# Patient Record
Sex: MALE | Race: WHITE | URBAN - METROPOLITAN AREA
[De-identification: names, ages, dates, MRNs, and addresses within clinical notes are randomized per-mention and may not be internally consistent; named-entity substitution may affect disease eponyms.]

---

## 2020-12-28 ENCOUNTER — OFFICE VISIT (OUTPATIENT)
Dept: FAMILY MEDICINE | Facility: CLINIC | Age: 50
End: 2020-12-28
Payer: COMMERCIAL

## 2020-12-28 ENCOUNTER — ALLIED HEALTH/NURSE VISIT (OUTPATIENT)
Dept: NURSING | Facility: CLINIC | Age: 50
End: 2020-12-28
Payer: COMMERCIAL

## 2020-12-28 DIAGNOSIS — R07.9 CHEST PAIN: Primary | ICD-10-CM

## 2020-12-28 DIAGNOSIS — E78.5 HYPERLIPIDEMIA LDL GOAL <70: ICD-10-CM

## 2020-12-28 DIAGNOSIS — I46.9 CARDIAC ARREST (H): ICD-10-CM

## 2020-12-28 DIAGNOSIS — I25.110 CORONARY ARTERY DISEASE INVOLVING NATIVE CORONARY ARTERY OF NATIVE HEART WITH UNSTABLE ANGINA PECTORIS (H): ICD-10-CM

## 2020-12-28 PROCEDURE — 99203 OFFICE O/P NEW LOW 30 MIN: CPT | Performed by: FAMILY MEDICINE

## 2020-12-28 RX ORDER — NITROGLYCERIN 0.4 MG/1
0.4 TABLET SUBLINGUAL EVERY 5 MIN PRN
Status: ACTIVE | OUTPATIENT
Start: 2020-12-28

## 2020-12-28 RX ORDER — ASPIRIN 81 MG/1
81 TABLET, CHEWABLE ORAL ONCE
Status: COMPLETED | OUTPATIENT
Start: 2020-12-28 | End: 2020-12-28

## 2020-12-28 RX ADMIN — NITROGLYCERIN 0.4 MG: 0.4 TABLET SUBLINGUAL at 11:11

## 2020-12-28 RX ADMIN — ASPIRIN 325 MG: 81 TABLET, CHEWABLE ORAL at 11:14

## 2020-12-28 NOTE — PROGRESS NOTES
[facility]  [place]    SUBJECTIVE    Richar Frost is a 50 year old male who presents to clinic today for the following health issues:    Richar presented as a walk in with chest tightness, new to the clinic, with his spouse, while on the scale, he was noted to collapse, further assessment revealed he was unconscious, and in cardiac arrest    Promptly CPR was started, 911 called, O2, ambu bag/mask ventilation, AED attached, rhythm analyzed, shocked x 2, nitroglycerin x 1 given, 4 ASA 81 mg chewables given, he was able to swallow, pads had to be repetatively resecured, patient had episodes regaining consciousness    911 arrived, on their monitor he was noted to go into VT, he was shocked a 3rd time, went back into sinus rhythm, he was quickly lifted onto the gurney, transport was advised to [facility] for urgent cath lab evaluation, [facility] notified    See attached notes    Reviewed and updated as needed this visit by Provider                   BP Readings from Last 3 Encounters:   No data found for BP       body mass index is unknown because there is no height or weight on file.    Wt Readings from Last 4 Encounters:   No data found for Wt       Health Maintenance    Health Maintenance Due   Topic Date Due     PREVENTIVE CARE VISIT  1970     ANNUAL REVIEW OF HM ORDERS  1970     HIV SCREENING  07/14/1986     HEPATITIS C SCREENING  07/14/1989     DTAP/TDAP/TD IMMUNIZATION (1 - Tdap) 07/14/1996     LIPID  07/14/2006     PHQ-2  01/01/2020     INFLUENZA VACCINE (1) 09/01/2020       Current Problem List    Patient Active Problem List   Diagnosis     Cardiac arrest (H)     Hyperlipidemia LDL goal <70     Coronary artery disease involving native coronary artery of native heart with unstable angina pectoris (H)       Past Medical History    Past Medical History:   Diagnosis Date     Cardiac arrest (H) 12/28/2020    STEMI with VT, LAD stent     Coronary artery disease involving native coronary artery of native  heart with unstable angina pectoris (H) 12/28/2020    cardiac arrest with VT, LAD stent     Hyperlipidemia LDL goal <70        Past Surgical History    Past Surgical History:   Procedure Laterality Date     SURGICAL HISTORY OF -   12/28/2020    LAD Stent       Current Medications    No current outpatient medications on file.       Allergies    Not on File    Immunizations      There is no immunization history on file for this patient.    Family History    No family history on file.    Social History    Social History     Socioeconomic History     Marital status:      Spouse name: Not on file     Number of children: Not on file     Years of education: Not on file     Highest education level: Not on file   Occupational History     Not on file   Social Needs     Financial resource strain: Not on file     Food insecurity     Worry: Not on file     Inability: Not on file     Transportation needs     Medical: Not on file     Non-medical: Not on file   Tobacco Use     Smoking status: Not on file   Substance and Sexual Activity     Alcohol use: Not on file     Drug use: Not on file     Sexual activity: Not on file   Lifestyle     Physical activity     Days per week: Not on file     Minutes per session: Not on file     Stress: Not on file   Relationships     Social connections     Talks on phone: Not on file     Gets together: Not on file     Attends Religion service: Not on file     Active member of club or organization: Not on file     Attends meetings of clubs or organizations: Not on file     Relationship status: Not on file     Intimate partner violence     Fear of current or ex partner: Not on file     Emotionally abused: Not on file     Physically abused: Not on file     Forced sexual activity: Not on file   Other Topics Concern     Not on file   Social History Narrative     Not on file       All above reviewed and updated, all stable unless otherwise noted    Recent labs reviewed    ROS    Limited, as  above    OBJECTIVE    There were no vitals taken for this visit.  There is no height or weight on file to calculate BMI.  GENERAL: varying level on consciousness  EYES: Eyes grossly normal to inspection, extraocular movements - intact, and PERRL  RESP: lungs clear to auscultation - no rales, no rhonchi, no wheezes  CV: Variable, regular/irregular, rate variable with tachycardia  ABDOMEN: soft, no tenderness, no  hepatosplenomegaly, no masses, normal bowel sounds  MS: extremities- no gross deformities noted, no edema  SKIN: no suspicious lesions, no rashes  NEURO: non focal  PSYCH: variable    DIAGNOSTICS/PROCEDURE    See above     ASSESSMENT      ICD-10-CM    1. Cardiac arrest (H)  I46.9    2. Coronary artery disease involving native coronary artery of native heart with unstable angina pectoris (H)  I25.110    3. Hyperlipidemia LDL goal <70  E78.5        PLAN    Discussed treatment/modality options, including risk and benefits he desires:    See above resuscitation and transport    All diagnosis above reviewed and noted above, otherwise stable.  See [facility] orders for further details.     Return in about 1 week (around 1/4/2021), or if symptoms worsen or fail to improve.    Health Maintenance Due   Topic Date Due     PREVENTIVE CARE VISIT  1971     ANNUAL REVIEW OF  ORDERS  1970     HIV SCREENING  07/14/1986     HEPATITIS C SCREENING  07/14/1989     DTAP/TDAP/TD IMMUNIZATION (1 - Tdap) 07/14/1996     LIPID  07/14/2006     PHQ-2  01/01/2020     INFLUENZA VACCINE (1) 09/01/2020       See Patient Instructions             Juan Alberto Bass MD, FAAFP     [facility]  [address] [place] [id number]  [email or web address]   Office: [phone]  Fax: [phone]  Pager: [phone]

## 2020-12-28 NOTE — PROGRESS NOTES
Pt presented with chest pain acroos top of chest. Pt was walked back to get on scale for weight when Pt passed out falling to floor on his back. Pt then noted to have difficulty breathing. Writer auscultated heart without any heart tones noted. 911 called, Sujata BALDWIN ran to get AED, pads applied noting shock needed. First shock given at 1104am. CPR started, oxygen applied at 10L via mask. ambubag applied shortly after. Three rounds of compressions with O2 performed. AED reassessed. Shock advised, Pt was shocked at 1109am. Pt did respond, became alert at 1110am. Pt was then give 1 SL nitroglycerin at 1111am. Pt remained awake, was being questioned by Dr. Bass about chest pains, pt reports chest pain not as bad. Pt then given 325 mg ASA. BP was recorded at 149/97   95%O2    Ambulance arrived at 1118am, Pt was rolled on to sheet for transfer, Cardiac arrested. Shock for third time. Pt was transferred to [facility] and left to [facility] at 1120am.    Jefferson MCKENZIE RN   [facility]

## 2020-12-30 PROBLEM — I25.110 CORONARY ARTERY DISEASE INVOLVING NATIVE CORONARY ARTERY OF NATIVE HEART WITH UNSTABLE ANGINA PECTORIS (H): Status: ACTIVE | Noted: 2020-12-28

## 2020-12-30 PROBLEM — I46.9 CARDIAC ARREST (H): Status: ACTIVE | Noted: 2020-12-28
